# Patient Record
Sex: FEMALE | Race: WHITE | Employment: FULL TIME | URBAN - METROPOLITAN AREA
[De-identification: names, ages, dates, MRNs, and addresses within clinical notes are randomized per-mention and may not be internally consistent; named-entity substitution may affect disease eponyms.]

---

## 2019-08-30 ENCOUNTER — HOSPITAL ENCOUNTER (OUTPATIENT)
Dept: SLEEP CENTER | Age: 34
Discharge: HOME | End: 2019-08-30
Payer: COMMERCIAL

## 2019-08-30 VITALS
HEART RATE: 82 BPM | OXYGEN SATURATION: 95 % | HEIGHT: 67 IN | SYSTOLIC BLOOD PRESSURE: 142 MMHG | DIASTOLIC BLOOD PRESSURE: 78 MMHG | WEIGHT: 293 LBS | BODY MASS INDEX: 45.99 KG/M2

## 2019-08-30 DIAGNOSIS — G47.10 HYPERSOMNOLENCE: Primary | ICD-10-CM

## 2019-08-30 DIAGNOSIS — R06.83 SNORING: ICD-10-CM

## 2019-08-30 PROCEDURE — 99211 OFF/OP EST MAY X REQ PHY/QHP: CPT

## 2019-08-30 ASSESSMENT — SLEEP AND FATIGUE QUESTIONNAIRES
HOW LIKELY ARE YOU TO NOD OFF OR FALL ASLEEP WHEN YOU ARE A PASSENGER IN A CAR FOR AN HOUR WITHOUT A BREAK: 1
HOW LIKELY ARE YOU TO NOD OFF OR FALL ASLEEP WHILE LYING DOWN TO REST IN THE AFTERNOON WHEN CIRCUMSTANCES PERMIT: 2
HOW LIKELY ARE YOU TO NOD OFF OR FALL ASLEEP WHILE SITTING INACTIVE IN A PUBLIC PLACE: 1
ESS TOTAL SCORE: 11
HOW LIKELY ARE YOU TO NOD OFF OR FALL ASLEEP WHILE SITTING AND READING: 2
HOW LIKELY ARE YOU TO NOD OFF OR FALL ASLEEP WHILE SITTING AND TALKING TO SOMEONE: 1
HOW LIKELY ARE YOU TO NOD OFF OR FALL ASLEEP WHILE WATCHING TV: 2
HOW LIKELY ARE YOU TO NOD OFF OR FALL ASLEEP WHILE SITTING QUIETLY AFTER LUNCH WITHOUT ALCOHOL: 1
HOW LIKELY ARE YOU TO NOD OFF OR FALL ASLEEP IN A CAR, WHILE STOPPED FOR A FEW MINUTES IN TRAFFIC: 1

## 2019-08-30 NOTE — PROGRESS NOTES
judgement and insight. No current outpatient medications on file prior to encounter. No current facility-administered medications on file prior to encounter. Data Reviewed:       Assessment:     1. EDS Snoring R/O RUTHIE. Plan:      1. HST. 2. Sleep hygiene. 3. RTO 1 mth.         c

## 2019-09-26 ENCOUNTER — HOSPITAL ENCOUNTER (OUTPATIENT)
Dept: SLEEP CENTER | Age: 34
Discharge: HOME | End: 2019-09-26
Payer: COMMERCIAL

## 2019-09-26 PROCEDURE — G0398 HOME SLEEP TEST/TYPE 2 PORTA: HCPCS

## 2019-10-04 ENCOUNTER — HOSPITAL ENCOUNTER (OUTPATIENT)
Dept: SLEEP CENTER | Age: 34
Discharge: HOME | End: 2019-10-04
Payer: COMMERCIAL

## 2019-10-04 PROCEDURE — 9990000010 HC NO CHARGE VISIT: Performed by: INTERNAL MEDICINE

## 2019-12-05 ENCOUNTER — APPOINTMENT (OUTPATIENT)
Dept: ULTRASOUND IMAGING | Age: 34
End: 2019-12-05
Payer: COMMERCIAL

## 2019-12-05 ENCOUNTER — HOSPITAL ENCOUNTER (EMERGENCY)
Age: 34
Discharge: HOME | End: 2019-12-06
Attending: EMERGENCY MEDICINE
Payer: COMMERCIAL

## 2019-12-05 DIAGNOSIS — N93.8 DYSFUNCTIONAL UTERINE BLEEDING: Primary | ICD-10-CM

## 2019-12-05 PROCEDURE — 80053 COMPREHEN METABOLIC PANEL: CPT

## 2019-12-05 PROCEDURE — 85025 COMPLETE CBC W/AUTO DIFF WBC: CPT

## 2019-12-05 PROCEDURE — 2580000003 HC RX 258: Performed by: EMERGENCY MEDICINE

## 2019-12-05 PROCEDURE — 81025 URINE PREGNANCY TEST: CPT

## 2019-12-05 PROCEDURE — 84702 CHORIONIC GONADOTROPIN TEST: CPT

## 2019-12-05 PROCEDURE — 83690 ASSAY OF LIPASE: CPT

## 2019-12-05 PROCEDURE — 81001 URINALYSIS AUTO W/SCOPE: CPT

## 2019-12-05 PROCEDURE — 99284 EMERGENCY DEPT VISIT MOD MDM: CPT

## 2019-12-05 RX ORDER — 0.9 % SODIUM CHLORIDE 0.9 %
1000 INTRAVENOUS SOLUTION INTRAVENOUS ONCE
Status: COMPLETED | OUTPATIENT
Start: 2019-12-05 | End: 2019-12-06

## 2019-12-05 RX ADMIN — SODIUM CHLORIDE 1000 ML: 9 INJECTION, SOLUTION INTRAVENOUS at 23:59

## 2019-12-05 SDOH — HEALTH STABILITY: MENTAL HEALTH: HOW OFTEN DO YOU HAVE A DRINK CONTAINING ALCOHOL?: NEVER

## 2019-12-05 ASSESSMENT — PAIN DESCRIPTION - LOCATION: LOCATION: ABDOMEN

## 2019-12-05 ASSESSMENT — PAIN DESCRIPTION - ONSET: ONSET: ON-GOING

## 2019-12-05 ASSESSMENT — PAIN DESCRIPTION - DESCRIPTORS: DESCRIPTORS: ACHING

## 2019-12-05 ASSESSMENT — PAIN DESCRIPTION - FREQUENCY: FREQUENCY: INTERMITTENT

## 2019-12-05 ASSESSMENT — PAIN DESCRIPTION - PAIN TYPE: TYPE: ACUTE PAIN

## 2019-12-05 ASSESSMENT — PAIN DESCRIPTION - ORIENTATION: ORIENTATION: MID

## 2019-12-05 ASSESSMENT — PAIN SCALES - GENERAL: PAINLEVEL_OUTOF10: 7

## 2019-12-06 ENCOUNTER — APPOINTMENT (OUTPATIENT)
Dept: ULTRASOUND IMAGING | Age: 34
End: 2019-12-06
Payer: COMMERCIAL

## 2019-12-06 VITALS
RESPIRATION RATE: 16 BRPM | HEIGHT: 66 IN | WEIGHT: 293 LBS | OXYGEN SATURATION: 99 % | BODY MASS INDEX: 47.09 KG/M2 | SYSTOLIC BLOOD PRESSURE: 141 MMHG | HEART RATE: 83 BPM | DIASTOLIC BLOOD PRESSURE: 77 MMHG | TEMPERATURE: 98.5 F

## 2019-12-06 LAB
ALBUMIN SERPL-MCNC: 3.6 GM/DL (ref 3.4–5)
ALP BLD-CCNC: 94 IU/L (ref 40–128)
ALT SERPL-CCNC: 33 U/L (ref 10–40)
ANION GAP SERPL CALCULATED.3IONS-SCNC: 13 MMOL/L (ref 4–16)
AST SERPL-CCNC: 22 IU/L (ref 15–37)
BACTERIA: NEGATIVE /HPF
BASOPHILS ABSOLUTE: 0 K/CU MM
BASOPHILS RELATIVE PERCENT: 0.3 % (ref 0–1)
BILIRUB SERPL-MCNC: 0.2 MG/DL (ref 0–1)
BILIRUBIN URINE: NEGATIVE MG/DL
BLOOD, URINE: ABNORMAL
BUN BLDV-MCNC: 19 MG/DL (ref 6–23)
CALCIUM SERPL-MCNC: 8.8 MG/DL (ref 8.3–10.6)
CHLORIDE BLD-SCNC: 102 MMOL/L (ref 99–110)
CLARITY: ABNORMAL
CO2: 24 MMOL/L (ref 21–32)
COLOR: ABNORMAL
CREAT SERPL-MCNC: 0.8 MG/DL (ref 0.6–1.1)
DIFFERENTIAL TYPE: ABNORMAL
EOSINOPHILS ABSOLUTE: 0.1 K/CU MM
EOSINOPHILS RELATIVE PERCENT: 1.2 % (ref 0–3)
GFR AFRICAN AMERICAN: >60 ML/MIN/1.73M2
GFR NON-AFRICAN AMERICAN: >60 ML/MIN/1.73M2
GLUCOSE BLD-MCNC: 99 MG/DL (ref 70–99)
GLUCOSE, URINE: NEGATIVE MG/DL
GONADOTROPIN, CHORIONIC (HCG) QUANT: NORMAL UIU/ML
HCT VFR BLD CALC: 42.9 % (ref 37–47)
HEMOGLOBIN: 13 GM/DL (ref 12.5–16)
IMMATURE NEUTROPHIL %: 0.2 % (ref 0–0.43)
INTERPRETATION: NORMAL
KETONES, URINE: NEGATIVE MG/DL
LEUKOCYTE ESTERASE, URINE: ABNORMAL
LIPASE: 29 IU/L (ref 13–60)
LYMPHOCYTES ABSOLUTE: 3.6 K/CU MM
LYMPHOCYTES RELATIVE PERCENT: 35.2 % (ref 24–44)
MCH RBC QN AUTO: 28.8 PG (ref 27–31)
MCHC RBC AUTO-ENTMCNC: 30.3 % (ref 32–36)
MCV RBC AUTO: 94.9 FL (ref 78–100)
MONOCYTES ABSOLUTE: 0.8 K/CU MM
MONOCYTES RELATIVE PERCENT: 8.1 % (ref 0–4)
NITRITE URINE, QUANTITATIVE: NEGATIVE
NUCLEATED RBC %: 0 %
PDW BLD-RTO: 13.9 % (ref 11.7–14.9)
PH, URINE: 6 (ref 5–8)
PLATELET # BLD: 316 K/CU MM (ref 140–440)
PMV BLD AUTO: 10.9 FL (ref 7.5–11.1)
POTASSIUM SERPL-SCNC: 3.8 MMOL/L (ref 3.5–5.1)
PREGNANCY, URINE: NEGATIVE
PROTEIN UA: 100 MG/DL
RBC # BLD: 4.52 M/CU MM (ref 4.2–5.4)
RBC URINE: 6977 /HPF (ref 0–6)
REASON FOR REJECTION: NORMAL
REASON FOR REJECTION: NORMAL
REJECTED TEST: NORMAL
SEGMENTED NEUTROPHILS ABSOLUTE COUNT: 5.7 K/CU MM
SEGMENTED NEUTROPHILS RELATIVE PERCENT: 55 % (ref 36–66)
SODIUM BLD-SCNC: 139 MMOL/L (ref 135–145)
SPECIFIC GRAVITY UA: 1.02 (ref 1–1.03)
SPECIFIC GRAVITY, URINE: 1.02 (ref 1–1.03)
TOTAL IMMATURE NEUTOROPHIL: 0.02 K/CU MM
TOTAL NUCLEATED RBC: 0 K/CU MM
TOTAL PROTEIN: 6.5 GM/DL (ref 6.4–8.2)
TRICHOMONAS: ABNORMAL /HPF
UROBILINOGEN, URINE: NORMAL MG/DL (ref 0.2–1)
WBC # BLD: 10.3 K/CU MM (ref 4–10.5)
WBC UA: 220 /HPF (ref 0–5)

## 2019-12-06 PROCEDURE — 84702 CHORIONIC GONADOTROPIN TEST: CPT

## 2019-12-06 PROCEDURE — 83690 ASSAY OF LIPASE: CPT

## 2019-12-06 PROCEDURE — 93975 VASCULAR STUDY: CPT

## 2019-12-06 PROCEDURE — 76830 TRANSVAGINAL US NON-OB: CPT

## 2019-12-06 PROCEDURE — 80053 COMPREHEN METABOLIC PANEL: CPT

## 2019-12-06 RX ORDER — MEDROXYPROGESTERONE ACETATE 10 MG/1
10 TABLET ORAL DAILY
Qty: 7 TABLET | Refills: 0 | Status: SHIPPED | OUTPATIENT
Start: 2019-12-06 | End: 2021-05-04

## 2019-12-06 RX ORDER — NAPROXEN 500 MG/1
500 TABLET ORAL 2 TIMES DAILY PRN
Qty: 20 TABLET | Refills: 0 | Status: SHIPPED | OUTPATIENT
Start: 2019-12-06 | End: 2021-05-04

## 2021-05-03 NOTE — PROGRESS NOTES
Patient Name:  Patrick Camacho  Patient :  1985  Patient MRN:  [id number]     Primary Oncologist: Brayden Forrest MD  Referring Provider: DEIDRA Carpenter - CNP     Date of Service: 2021      Reason for Consult:  splenomegaly      Chief Complaint:  No chief complaint on file. Patient Active Problem List:     Snoring     Hypersomnolence    HPI:   Patrick Camacho is a pleasant 28year old female patient who was referred for splenomegaly. In 2021 she had diarrhea and stomach pain. Diarrhea occurred 5 minutes after meals. This lasted about 5 -6 days. One half month later she has recurrent diarrhea with abdominal and went to PCP. She was bentyl with improvement of the symptoms. At present time she had diarrhea every once a while. CT abdomen and pelvis 3/5/2021:  Liver, spleen and biliary system: There is splenomegaly measuring 15 x 4 cm. No hepatomegaly, focal hepatic or splenic lesion seen. There is no dilatation of the intrahepatic biliary radicles. Patient is status post cholecystectomy. US compared to CT 3/5/2021:  Focused sonographic evaluation of the spleen shows maximal dimensions 12.8 x 4.9 x 4.2 cm. No definite focal splenic abnormality. Size is similar to that estimated on previous CT allowing for differences in technique    CBC in 2019, 2021 and 2021 was unremarkable. CMP in 2019 showed mildly elevated ALT at 54. In 2021 ALT was 19, but alk phos was 127H. In 2021 alk phos was 124 and ALT 45. I discussed with her about elevated alkaline phosphatase and ALT. I ordered hepatitis workup and referred to GI. I ordered CMV, EBV ab. She denied any night sweat or weight loss. She had covid vaccine. No prior history of colonoscopy. Past Medical History:    Anxiety    Depression    Obesity     Past Surgery History:     CHOLECYSTECTOMY     Social History:   She denied any smoking history. She drinks a glass of wine weekly.  Denied illicit drug use. No children. Family History:   No cancer in the family. No Known Allergies    Current Outpatient Medications on File Prior to Visit   Medication Sig Dispense Refill    medroxyPROGESTERone (PROVERA) 10 MG tablet Take 1 tablet by mouth daily 7 tablet 0    naproxen (NAPROSYN) 500 MG tablet Take 1 tablet by mouth 2 times daily as needed for Pain 20 tablet 0     No current facility-administered medications on file prior to visit. Review of Systems:    Constitutional:  No weight loss, No fever, No chills, No night sweats. Energy level good. Eyes:  No diplopia, No transient or permanent loss of vision, No scotomata. ENT / Mouth:  No epistaxis, No dysphagia, No hoarseness, No oral ulcers, No gingival bleeding. No sore throat, No postnasal drip, No nasal drip, No mouth pain, No sinus pain, No tinnitus, Normal hearing. Cardiovascular:  No chest pain, No palpitations, No syncope, No upper extremity edema, No lower extremity edema, No calf discomfort. Respiratory:  No cough. No hemoptysis, No pleurisy, No wheezing, No dyspnea. Breast:  No breast mass, No pain, No nipple discharge, No change in size, No change in shape. Gastrointestinal:  No abdominal pain, No abdominal cramping, No nausea, No vomiting, No constipation, intermittent diarrhea, No hematochezia, No melena, No jaundice, No dyspepsia, No dysphagia. Urinary:  No dysuria, No hematuria, No urinary incontinence. Gynecological:  No vaginal discharge, No suprapubic pain, No abnormal vaginal bleeding. (Female Patients Only)  Musculoskeletal:  No muscle pain, No swollen joints, No joint redness, No bone pain, No spine tenderness. Skin:  No rash, No nodules, No pruritus, No lesions. Neurologic:  No confusion, No seizures, No syncope, No tremor, No speech change, No headache, No hiccups, No abnormal gait, No sensory changes, No weakness.   Psychiatric:  No depression, No anxiety, Concentration normal.  Endocrine:  No polyuria, No polydipsia, No hot flashes, No thyroid symptoms. Hematologic:  No epistaxis, No gingival bleeding, No petechiae, No ecchymosis. Lymphatic:  No lymphadenopathy, No lymphedema. Allergy / Immunologic:  No eczema, No frequent mucous infections, No frequent respiratory infections, No recurrent urticarial, No frequent skin infections. Vital Signs: There were no vitals taken for this visit. Physical Exam:  CONSTITUTIONAL: awake, alert, cooperative, no apparent distress   EYES: pupils equal, round and reactive to light, sclera clear and conjunctiva normal  ENT: Normocephalic, without obvious abnormality, atraumatic  NECK: supple, symmetrical, no jugular venous distension and no carotid bruits   HEMATOLOGIC/LYMPHATIC: no cervical, supraclavicular or axillary lymphadenopathy   LUNGS: no increased work of breathing and clear to auscultation   CARDIOVASCULAR: regular rate and rhythm, normal S1 and S2, no murmur noted  ABDOMEN: obese, normal bowel sounds x 4, soft, non-distended, non-tender, no masses palpated, no hepatosplenomegaly   MUSCULOSKELETAL: full range of motion noted, tone is normal  NEUROLOGIC: awake, alert, oriented to name, place and time. Motor skills grossly intact. SKIN: Normal skin color, texture, turgor and no jaundice.  appears intact   EXTREMITIES: no LE edema        Labs:  Hematology:  Lab Results   Component Value Date    WBC 10.3 12/05/2019    RBC 4.52 12/05/2019    HGB 13.0 12/05/2019    HCT 42.9 12/05/2019    MCV 94.9 12/05/2019    MCH 28.8 12/05/2019    MCHC 30.3 (L) 12/05/2019    RDW 13.9 12/05/2019     12/05/2019    MPV 10.9 12/05/2019    SEGSPCT 55.0 12/05/2019    EOSRELPCT 1.2 12/05/2019    BASOPCT 0.3 12/05/2019    LYMPHOPCT 35.2 12/05/2019    MONOPCT 8.1 (H) 12/05/2019    SEGSABS 5.7 12/05/2019    EOSABS 0.1 12/05/2019    BASOSABS 0.0 12/05/2019    LYMPHSABS 3.6 12/05/2019    MONOSABS 0.8 12/05/2019    DIFFTYPE AUTOMATED DIFFERENTIAL 12/05/2019     Chemistry:  Lab Results Component Value Date     12/06/2019    K 3.8 12/06/2019     12/06/2019    CO2 24 12/06/2019    BUN 19 12/06/2019    CREATININE 0.8 12/06/2019    GLUCOSE 99 12/06/2019    CALCIUM 8.8 12/06/2019    PROT 6.5 12/06/2019    LABALBU 3.6 12/06/2019    BILITOT 0.2 12/06/2019    ALKPHOS 94 12/06/2019    AST 22 12/06/2019    ALT 33 12/06/2019    LABGLOM >60 12/06/2019    GFRAA >60 12/06/2019     Immunology:  Lab Results   Component Value Date    PROT 6.5 12/06/2019        Observations:  No data recorded     Assessment & Plan:  1. She has nonspecific splenomegaly. CBC was unremarkable. I will order CMV, EBV ab, LDH and flow cytometry study. No pathological weight loss. No peripheral lymphadenopathy. 2. She has elevated ALT and alkaline phosphatase. I ordered hepatitis work up and referred to GI.    3. Obesity. We discussed about diet, exercise and weight loss. She had covid vaccine. RTC in 3 weeks or sooner. All of her questions have been answered for today. I have discussed the above stated plan with the patient and they verbalized understanding and agreed with the plan. Thank you for allowing us to participate in this patient's care.       Electronically signed by Marty Kimbrough MD on 5/3/21 at 7:02 AM EDT

## 2021-05-04 ENCOUNTER — HOSPITAL ENCOUNTER (OUTPATIENT)
Dept: INFUSION THERAPY | Age: 36
Discharge: HOME | End: 2021-05-04
Payer: COMMERCIAL

## 2021-05-04 ENCOUNTER — INITIAL CONSULT (OUTPATIENT)
Dept: ONCOLOGY | Age: 36
End: 2021-05-04
Payer: COMMERCIAL

## 2021-05-04 VITALS
BODY MASS INDEX: 47.09 KG/M2 | TEMPERATURE: 96.8 F | OXYGEN SATURATION: 97 % | DIASTOLIC BLOOD PRESSURE: 111 MMHG | WEIGHT: 293 LBS | HEART RATE: 99 BPM | SYSTOLIC BLOOD PRESSURE: 175 MMHG | HEIGHT: 66 IN

## 2021-05-04 DIAGNOSIS — R16.1 SPLENOMEGALY: Primary | ICD-10-CM

## 2021-05-04 DIAGNOSIS — R16.1 SPLENOMEGALY: ICD-10-CM

## 2021-05-04 LAB
HAV IGM SER IA-ACNC: NON REACTIVE
HEPATITIS B CORE IGM ANTIBODY: NON REACTIVE
HEPATITIS B SURFACE ANTIGEN: NON REACTIVE
HEPATITIS C ANTIBODY: NON REACTIVE
LACTATE DEHYDROGENASE: 255 IU/L (ref 120–246)

## 2021-05-04 PROCEDURE — 86663 EPSTEIN-BARR ANTIBODY: CPT

## 2021-05-04 PROCEDURE — 83516 IMMUNOASSAY NONANTIBODY: CPT

## 2021-05-04 PROCEDURE — 86256 FLUORESCENT ANTIBODY TITER: CPT

## 2021-05-04 PROCEDURE — 99202 OFFICE O/P NEW SF 15 MIN: CPT

## 2021-05-04 PROCEDURE — 86644 CMV ANTIBODY: CPT

## 2021-05-04 PROCEDURE — 86430 RHEUMATOID FACTOR TEST QUAL: CPT

## 2021-05-04 PROCEDURE — 86664 EPSTEIN-BARR NUCLEAR ANTIGEN: CPT

## 2021-05-04 PROCEDURE — 36415 COLL VENOUS BLD VENIPUNCTURE: CPT

## 2021-05-04 PROCEDURE — 86645 CMV ANTIBODY IGM: CPT

## 2021-05-04 PROCEDURE — 86038 ANTINUCLEAR ANTIBODIES: CPT

## 2021-05-04 PROCEDURE — 82390 ASSAY OF CERULOPLASMIN: CPT

## 2021-05-04 PROCEDURE — 83615 LACTATE (LD) (LDH) ENZYME: CPT

## 2021-05-04 PROCEDURE — 80074 ACUTE HEPATITIS PANEL: CPT

## 2021-05-04 PROCEDURE — 86665 EPSTEIN-BARR CAPSID VCA: CPT

## 2021-05-04 PROCEDURE — 99204 OFFICE O/P NEW MOD 45 MIN: CPT | Performed by: INTERNAL MEDICINE

## 2021-05-04 RX ORDER — COLESEVELAM 180 1/1
TABLET ORAL
COMMUNITY
Start: 2021-03-24

## 2021-05-04 RX ORDER — DICYCLOMINE HCL 20 MG
TABLET ORAL
COMMUNITY
Start: 2021-03-05

## 2021-05-04 ASSESSMENT — PATIENT HEALTH QUESTIONNAIRE - PHQ9
SUM OF ALL RESPONSES TO PHQ QUESTIONS 1-9: 0
SUM OF ALL RESPONSES TO PHQ QUESTIONS 1-9: 0

## 2021-05-04 NOTE — PROGRESS NOTES
MA Rooming Questions  Patient: Nadira Haynes  MRN: [id number]    Date: 5/4/2021      NEW PATIENT     5. Did the patient have a depression screening completed today?  Yes    PHQ-9 Total Score: 0 (5/4/2021  1:25 PM)       PHQ-9 Given to (if applicable):               PHQ-9 Score (if applicable):                     [] Positive     []  Negative              Does question #9 need addressed (if applicable)                     [] Yes    []  No               Merced Murillo MA

## 2021-05-06 LAB
CERULOPLASMIN: 30 MG/DL (ref 17–54)
RHEUMATOID FACTOR: <10 IU/ML (ref 0–14)

## 2021-05-06 NOTE — PROGRESS NOTES
will order ultrasound of abdomen prior to next office visit. She denied any night sweat or weight loss. She denied any acute pain. No nausea, vomiting or diarrhea. No fever or chills. Past Medical History:    Anxiety    Depression    Obesity     Past Surgery History:     CHOLECYSTECTOMY 2010    Social History:   She denied any smoking history. She drinks a glass of wine weekly. Denied illicit drug use. No children. Family History:   No cancer in the family. Review of Systems: The remainder of ROS is unremarkable. Vital Signs: BP (!) 149/81 (Site: Left Lower Arm, Position: Sitting, Cuff Size: Large Adult)   Pulse 89   Temp 97 °F (36.1 °C) (Temporal)   Resp 20   Ht 5' 6\" (1.676 m)   Wt (!) 448 lb 6.4 oz (203.4 kg)   SpO2 97%   BMI 72.37 kg/m²      Physical Exam:  CONSTITUTIONAL: awake, alert, cooperative, no apparent distress   EYES: pupils equal, round and reactive to light, sclera clear and conjunctiva normal  ENT: Normocephalic, without obvious abnormality, atraumatic  NECK: supple, symmetrical, no jugular venous distension and no carotid bruits   HEMATOLOGIC/LYMPHATIC: no cervical, supraclavicular or axillary lymphadenopathy   LUNGS: no increased work of breathing and clear to auscultation   CARDIOVASCULAR: regular rate and rhythm, normal S1 and S2, no murmur noted  ABDOMEN: obese, normal bowel sounds, soft, non-distended, non-tender, no masses palpated, no hepatosplenomegaly   MUSCULOSKELETAL: full range of motion noted, tone is normal  NEUROLOGIC: awake, alert, oriented to name, place and time. Motor skills grossly intact. Cranial nerves II through XII grossly intact. SKIN: Normal skin color, texture, turgor and no jaundice.  appears intact   EXTREMITIES: no LE edema  or cyanosis       Labs:  Hematology:  Lab Results   Component Value Date    WBC 10.3 12/05/2019    RBC 4.52 12/05/2019    HGB 13.0 12/05/2019    HCT 42.9 12/05/2019    MCV 94.9 12/05/2019    MCH 28.8 12/05/2019 MCHC 30.3 (L) 12/05/2019    RDW 13.9 12/05/2019     12/05/2019    MPV 10.9 12/05/2019    SEGSPCT 55.0 12/05/2019    EOSRELPCT 1.2 12/05/2019    BASOPCT 0.3 12/05/2019    LYMPHOPCT 35.2 12/05/2019    MONOPCT 8.1 (H) 12/05/2019    SEGSABS 5.7 12/05/2019    EOSABS 0.1 12/05/2019    BASOSABS 0.0 12/05/2019    LYMPHSABS 3.6 12/05/2019    MONOSABS 0.8 12/05/2019    DIFFTYPE AUTOMATED DIFFERENTIAL 12/05/2019     Chemistry:  Lab Results   Component Value Date     12/06/2019    K 3.8 12/06/2019     12/06/2019    CO2 24 12/06/2019    BUN 19 12/06/2019    CREATININE 0.8 12/06/2019    GLUCOSE 99 12/06/2019    CALCIUM 8.8 12/06/2019    PROT 6.5 12/06/2019    LABALBU 3.6 12/06/2019    BILITOT 0.2 12/06/2019    ALKPHOS 94 12/06/2019    AST 22 12/06/2019    ALT 33 12/06/2019    LABGLOM >60 12/06/2019    GFRAA >60 12/06/2019     Immunology:  Lab Results   Component Value Date    PROT 6.5 12/06/2019        Observations:  No data recorded       Assessment & Plan:  1. She has nonspecific splenomegaly. CBC was unremarkable. She has remote history of mononucleosis. No pathological weight loss. No peripheral lymphadenopathy. I will have follow-up ultrasound of abdomen prior to next office visit. 2. She has elevated ALT and alkaline phosphatase. I acute viral hepatitis studies were negative. RA, DISHA, antimitochondrial antibody were negative. She has appointment to see GI in the near future. 3. Obesity. We discussed about diet, exercise and weight loss. She had covid vaccine. RTC in 6 months or sooner. All of her questions have been answered for today.        Electronically signed by Porter Rodriguez MD on 5/3/21 at 7:02 AM EDT

## 2021-05-07 LAB
ANTI-MITOCHON TITER: 3.8 UNITS (ref 0–24.9)
ANTI-NUCLEAR ANTIBODY (ANA): NORMAL
CMV IGM: <8 AU/ML
CYTOMEGALOVIRUS IGG ANTIBODY: <0.2 U/ML
EBV EARLY ANTIGEN AB, IGG: 8.8 U/ML (ref 0–10.9)
EBV NUCLEAR AG AB: 368 U/ML (ref 0–21.9)
EPSTEIN-BARR VCA IGG: 417 U/ML (ref 0–21.9)
EPSTEIN-BARR VCA IGM: <10 U/ML (ref 0–43.9)
F-ACTIN AB, IGG: 13 UNITS (ref 0–19)

## 2021-05-21 LAB — COMMENT: NORMAL

## 2021-05-26 ENCOUNTER — HOSPITAL ENCOUNTER (OUTPATIENT)
Dept: INFUSION THERAPY | Age: 36
Discharge: HOME | End: 2021-05-26
Payer: COMMERCIAL

## 2021-05-26 ENCOUNTER — TELEPHONE (OUTPATIENT)
Dept: ONCOLOGY | Age: 36
End: 2021-05-26

## 2021-05-26 ENCOUNTER — OFFICE VISIT (OUTPATIENT)
Dept: ONCOLOGY | Age: 36
End: 2021-05-26
Payer: COMMERCIAL

## 2021-05-26 VITALS
HEART RATE: 89 BPM | TEMPERATURE: 97 F | SYSTOLIC BLOOD PRESSURE: 149 MMHG | HEIGHT: 66 IN | DIASTOLIC BLOOD PRESSURE: 81 MMHG | BODY MASS INDEX: 47.09 KG/M2 | WEIGHT: 293 LBS | OXYGEN SATURATION: 97 % | RESPIRATION RATE: 20 BRPM

## 2021-05-26 DIAGNOSIS — R16.1 SPLENOMEGALY: Primary | ICD-10-CM

## 2021-05-26 PROCEDURE — 99213 OFFICE O/P EST LOW 20 MIN: CPT | Performed by: INTERNAL MEDICINE

## 2021-05-26 PROCEDURE — 99211 OFF/OP EST MAY X REQ PHY/QHP: CPT

## 2021-05-26 NOTE — TELEPHONE ENCOUNTER
Pt is going to [facility] to have [address] done on 6/4-- needs to arrive at 8:30 and NPO 12 hours-- left message for pt

## 2021-06-16 ENCOUNTER — TELEPHONE (OUTPATIENT)
Dept: GASTROENTEROLOGY | Age: 36
End: 2021-06-16